# Patient Record
Sex: FEMALE
[De-identification: names, ages, dates, MRNs, and addresses within clinical notes are randomized per-mention and may not be internally consistent; named-entity substitution may affect disease eponyms.]

---

## 2018-01-10 ENCOUNTER — HOSPITAL ENCOUNTER (OUTPATIENT)
Dept: HOSPITAL 75 - RAD | Age: 48
End: 2018-01-10
Attending: NURSE PRACTITIONER
Payer: COMMERCIAL

## 2018-01-10 DIAGNOSIS — R92.8: Primary | ICD-10-CM

## 2018-01-10 PROCEDURE — 77066 DX MAMMO INCL CAD BI: CPT

## 2018-01-10 PROCEDURE — 76642 ULTRASOUND BREAST LIMITED: CPT

## 2018-01-10 NOTE — DIAGNOSTIC IMAGING REPORT
INDICATION: Digital mammogram bilateral diagnostic.



This study was compared to prior exams of 01/21/2016, 01/07/2015

and 03/27/2013.



The current study was also evaluated with a Computer Aided

Detection (CAD) system.



FINDINGS: At this time, the patient does complain of lumps in the

upper-outer quadrant of each breast. Marker was placed over the

areas of concern.



The fibroglandular tissue in both breasts is dense. This does

limit the sensitivity of this exam. When compared to the previous

study, there does not appear to have been any significant change.

There are few benign-appearing calcifications scattered

throughout both breasts, but there is no primary or secondary

sign of malignancy noted. In particular, there is no evidence for

an abnormality in the area of the patient's palpable masses in

the upper aspects of each breast. Even so, I would recommend that

ultrasound of both breasts be performed for further study.



IMPRESSION: There is no evidence of malignancy. Ultrasound of

both breasts would be recommended for further evaluation,

however. 



ACR BI-RADS Category 0: Incomplete. (Needs additional imaging

evaluation).

Result letter will be mailed to the patient.

Note: At least 10% of breast cancer is not imaged by mammography.



Dictated by: 



  Dictated on workstation # [id number]

## 2018-01-10 NOTE — DIAGNOSTIC IMAGING REPORT
EXAMINATION:

Bilateral breast ultrasound.

 

INDICATION: 

Bilateral breast lumps.



FINDINGS:

By history, the patient has palpable abnormalities in the

upper-outer aspect of each breast. The diagnostic mammogram

performed earlier today in conjunction with this study failed to

show any discrete mass in either the right or the left breast. On

this study, there is no solid or cystic mass evident in the right

breast. I suspect that the palpable abnormality in question may

be secondary to fibroglandular tissue alone; however, if clinical

concern regarding an underlying abnormality persists, then biopsy

should still be considered.



In the area of the patient's palpable abnormality in the left

breast in the 2 o'clock position roughly 1 cm from the nipple,

there is a crescentic 1.5 x 0.6 x 1.2 cm avascular hypoechoic

lesion with through-transmission. I suspect that this is a cyst.

The configuration of this cyst is somewhat unusual but there is

no sign of an intracystic mass. There may be a few internal

echoes suggesting that this cyst has been slightly complicated by

infection and/or hemorrhage. I would recommend that a short-term

(3 month) followup ultrasound exam of the left breast be obtained

for continued evaluation.



There is no solid lesion identified in the left breast otherwise.





IMPRESSION:

1. There is no abnormality involving the right breast to coincide

with the patient's palpable mass. Recommendations as above.



2. There is a slightly complicated cyst in the region of the

patient's palpable abnormality in the left breast. A short-term

followup ultrasound exam of the left breast would be recommended

for further evaluation. 



ACR BI-RADS Category 3: Probably benign findings.



Dictated by: 



  Dictated on workstation # [id number]

## 2018-04-09 ENCOUNTER — HOSPITAL ENCOUNTER (OUTPATIENT)
Dept: HOSPITAL 75 - RAD | Age: 48
End: 2018-04-09
Attending: NURSE PRACTITIONER
Payer: COMMERCIAL

## 2018-04-09 DIAGNOSIS — N60.02: Primary | ICD-10-CM

## 2018-04-09 PROCEDURE — 76642 ULTRASOUND BREAST LIMITED: CPT

## 2018-04-09 NOTE — DIAGNOSTIC IMAGING REPORT
INDICATION: 

Three-month followup of left breast cyst.



COMPARISON:  

01/10/2018.



TECHNIQUE: 

Sonographic interrogation at the 2 o'clock location of the left

breast 1 cm from the nipple was performed.



FINDINGS:

There continues to be a slightly elongated cystic lesion at this

location measuring 12 mm x 6 mm x 7 mm compared with 16 mm x 6 mm

x 12 mm on the prior exam. No new mass is identified. No other

abnormality is seen.



IMPRESSION:

There has been a mild decrease in the size of a cystic mass at

the 2 o'clock location of the left breast 1 cm from the nipple

when compared with the examination from 3 months earlier. An

additional 3 month followup is recommended with ultrasound to

show continued stability.



ACR BI-RADS Category 3: Probably benign findings.



Dictated by: 



  Dictated on workstation # [id number]

## 2018-07-26 ENCOUNTER — HOSPITAL ENCOUNTER (OUTPATIENT)
Dept: HOSPITAL 75 - RAD | Age: 48
End: 2018-07-26
Attending: NURSE PRACTITIONER
Payer: COMMERCIAL

## 2018-07-26 DIAGNOSIS — N63.0: Primary | ICD-10-CM

## 2018-07-26 PROCEDURE — 76642 ULTRASOUND BREAST LIMITED: CPT

## 2018-07-26 NOTE — DIAGNOSTIC IMAGING REPORT
Ultrasound of left breast limited.



INDICATION: Followup exam.



FINDINGS: The recent left breast ultrasound exam performed on

04/09/18 noted a 12 x 6 x 7 mm elongate cyst in the 2 o'clock

position roughly 1 cm from the nipple. On this exam, that cyst

appears to have resolved.



IMPRESSION:

1. The cyst in the left breast seen on the prior exam is no

longer evident.

2. The patient should have her annual bilateral screening

mammogram on schedule in January of 2019.



ACR BI-RADS Category 1: Negative.



Dictated by: 



  Dictated on workstation # [id number]